# Patient Record
Sex: FEMALE | Race: OTHER | NOT HISPANIC OR LATINO | Employment: FULL TIME | URBAN - METROPOLITAN AREA
[De-identification: names, ages, dates, MRNs, and addresses within clinical notes are randomized per-mention and may not be internally consistent; named-entity substitution may affect disease eponyms.]

---

## 2024-07-26 NOTE — PROGRESS NOTES
"Subjective   Patient ID: Cleo Diaz is a 53 y.o. female who presents for Follow-up.  HPI  This 53-year-old female last seen in this office in August 2022 is being seen in follow-up.  She has been having some discomfort involving her right ear she does have a history of rheumatoid arthritis and when last seen she had developed a pigmented area on her lip.  There is been no background history of infections in the head and neck.  Swallowing and voice have been stable.  In the past she did have some difficulties with otitis media with effusion.  Review of Systems  A 12 point ROS has been reviewed and are negative for complaint except what is stated in the history of present illness and/or past medical history as noted in the EMR    Active Ambulatory Problems     Diagnosis Date Noted    LAUREN positive 12/20/2022    Mixed hearing loss of left ear 07/30/2024    Otitis media with effusion, left 07/30/2024    Pigmented skin lesion of uncertain nature 07/30/2024    Rheumatoid arthritis of multiple sites with negative rheumatoid factor (Multi) 12/20/2022    Sensorineural hearing loss of right ear 07/30/2024    Tympanosclerosis, right 07/30/2024     Resolved Ambulatory Problems     Diagnosis Date Noted    No Resolved Ambulatory Problems     Past Medical History:   Diagnosis Date    Other specified health status     Personal history of other diseases of the musculoskeletal system and connective tissue          Current Outpatient Medications:     hydroxychloroquine (Plaquenil) 200 mg tablet, Take 1 tablet (200 mg) by mouth once daily., Disp: , Rfl:      Social History     Tobacco Use    Smoking status: Never    Smokeless tobacco: Never   Substance Use Topics    Alcohol use: Yes        Height 1.6 m (5' 3\"), weight 61.2 kg (135 lb).    Objective   Physical Exam  EXAMINATION:    GENERAL DAVE.EARANCE: Alert, in no acute distress, normal pitch and clarity of voice, well-developed and nourished, cooperative.    HEAD/FACE: " Normocephalic, atraumatic, normal facial movements and strength, no no tenderness to palpation, no lesions noted.    SKIN: Normal turgor, no raised or ulcerative lesions, warm and dry to palpation.    EYES: Extraocular motions intact, no nystagmus noted, pupils equal and reactive to light and accommodation, no conjunctivitis.    EARS: Both ears--external ear anatomy is normal without lesions, auditory canals are patent and without skin abrasions or lesions, hearing is intact to voice, tympanic membranes are intact with no acute inflammation, light reflexes present, no effusions are noted and no mastoid tenderness found to palpation.    NOSE: No external skin lesions are noted, nares are patent, septum is intact, sinuses are nontender to palpation bilaterally, no intranasal lesions or inflammation is noted, nasal valve is normal.    OROPHARYNX/ORAL CAVITY: Oropharynx is not inflamed and is without lesions, mucosa of the oral cavity is intact and without lesions, tongue is midline and mobile, no acute dental disease is noted, TMJs are mobile    LUNG-- NO wheezing or rhonchi normal respiratory effort    HEART-- No venous congestion,  rate and rhythm regular,    NECK: No lymphadenopathy is palpated, carotid pulses are intact, neck is supple with full range of motion, no thyroid abnormalities are noted, trachea is midline, no neck masses are palpated.    LYMPHATICS: No cervical adenopathy or supraclavicular adenopathy is palpated.    NEUROLOGIC/PSYCH; alert and oriented, cranial nerves are grossly intact, gait is without falling, no motor deficits are noted.  Her audiogram today on the left ear revealed normal hearing up through 6000 Hz with a mild high-frequency hearing loss at 8000 Hz.  The right side had the same hearing up through 4000 Hz then a severe high-frequency hearing loss.  Discrimination scores are 100% bilaterally 10 dB RT levels 45 dB presentation levels.  Pentagrams revealed to be pattern inconsistent  with the hearing l test results.    Assessment/Plan   Problem List Items Addressed This Visit    None  Visit Diagnoses         Codes    Left ear pain    -  Primary H92.02    Sensorineural hearing loss (SNHL) of both ears     H90.3    History of rheumatoid arthritis     Z87.39            I discussed the hearing test findings with the patient.  From the standpoint of her exam she does have tympanosclerotic changes right more than left reflective of past infections and or traumas.  The hearing test revealed basically normal hearing up through 4000 Hz along with normal speech audiometry.  Tympanograms were still registering a type B pattern which likely reflects some stiffness due to the sclerosis or some residual fluid from when she may have had some congestion recently.  There is a high tone asymmetry to hearing which was discussed with her.  She has had this based on past test as well.  I made her aware the fact that if there is any increase in the asymmetry then retrocochlear testing would become necessary.  She prefers not to undergo that at this point since she is having no other symptoms.  Recheck in 1 year was recommended with an audiogram.  If she has any difficulties with pain, drainage or infection she needs to contact the office.  Jaskaran Murillo DMD, MD 07/30/24 1:41 PM

## 2024-07-30 ENCOUNTER — APPOINTMENT (OUTPATIENT)
Dept: OTOLARYNGOLOGY | Facility: CLINIC | Age: 54
End: 2024-07-30
Payer: COMMERCIAL

## 2024-07-30 ENCOUNTER — APPOINTMENT (OUTPATIENT)
Dept: AUDIOLOGY | Facility: CLINIC | Age: 54
End: 2024-07-30
Payer: COMMERCIAL

## 2024-07-30 VITALS — HEIGHT: 63 IN | WEIGHT: 135 LBS | BODY MASS INDEX: 23.92 KG/M2

## 2024-07-30 DIAGNOSIS — H90.3 SENSORINEURAL HEARING LOSS (SNHL) OF BOTH EARS: ICD-10-CM

## 2024-07-30 DIAGNOSIS — H92.02 LEFT EAR PAIN: Primary | ICD-10-CM

## 2024-07-30 DIAGNOSIS — H69.93 DYSFUNCTION OF BOTH EUSTACHIAN TUBES: ICD-10-CM

## 2024-07-30 DIAGNOSIS — H90.3 ASYMMETRICAL SENSORINEURAL HEARING LOSS: ICD-10-CM

## 2024-07-30 DIAGNOSIS — H90.3 SENSORINEURAL HEARING LOSS, ASYMMETRICAL: Primary | ICD-10-CM

## 2024-07-30 DIAGNOSIS — Z87.39 HISTORY OF RHEUMATOID ARTHRITIS: ICD-10-CM

## 2024-07-30 PROBLEM — L81.9 PIGMENTED SKIN LESION OF UNCERTAIN NATURE: Status: ACTIVE | Noted: 2024-07-30

## 2024-07-30 PROBLEM — M06.09 RHEUMATOID ARTHRITIS OF MULTIPLE SITES WITH NEGATIVE RHEUMATOID FACTOR (MULTI): Status: ACTIVE | Noted: 2022-12-20

## 2024-07-30 PROBLEM — H90.72 MIXED HEARING LOSS OF LEFT EAR: Status: ACTIVE | Noted: 2024-07-30

## 2024-07-30 PROBLEM — R76.8 ANA POSITIVE: Status: ACTIVE | Noted: 2022-12-20

## 2024-07-30 PROBLEM — H65.92 OTITIS MEDIA WITH EFFUSION, LEFT: Status: ACTIVE | Noted: 2024-07-30

## 2024-07-30 PROBLEM — H90.5 SENSORINEURAL HEARING LOSS OF RIGHT EAR: Status: ACTIVE | Noted: 2024-07-30

## 2024-07-30 PROBLEM — H74.01: Status: ACTIVE | Noted: 2024-07-30

## 2024-07-30 PROCEDURE — 92557 COMPREHENSIVE HEARING TEST: CPT | Performed by: AUDIOLOGIST

## 2024-07-30 PROCEDURE — 1036F TOBACCO NON-USER: CPT | Performed by: OTOLARYNGOLOGY

## 2024-07-30 PROCEDURE — 92550 TYMPANOMETRY & REFLEX THRESH: CPT | Performed by: AUDIOLOGIST

## 2024-07-30 PROCEDURE — 3008F BODY MASS INDEX DOCD: CPT | Performed by: OTOLARYNGOLOGY

## 2024-07-30 PROCEDURE — 99214 OFFICE O/P EST MOD 30 MIN: CPT | Performed by: OTOLARYNGOLOGY

## 2024-07-30 RX ORDER — HYDROXYCHLOROQUINE SULFATE 200 MG/1
200 TABLET, FILM COATED ORAL DAILY
COMMUNITY
Start: 2022-08-30

## 2024-07-30 NOTE — PROGRESS NOTES
[facility]  AUDIOMETRIC EVALUATION      Name:  Cleo Diaz   :  1970  Age:  53 y.o.  Date of Evaluation:  24    HISTORY    Cleo Diaz is seen today at the request of Jaskaran Murillo M.D., ALDAIR., F.A.C.S.  The patient is an established patient monitoring hearing loss progression.  She has not been seen for her hearing in many years.    EVALUATION  See scanned audiogram in Media and included at the end of this report.    RESULTS    Otoscopic Evaluation:  Right Ear:  clear   Left Ear:  clear    Tympanometry:   Right Ear:  Type B, suggestive of middle ear fluid   Left Ear:  Type B, suggestive of middle ear fluid    Acoustic reflexes were absent    Pure Tone Audiometry:    Right Ear:  normal to severe sensorineural hearing loss  Left Ear:  normal to mild sensorineural hearing loss       Speech Audiometry:    Right Ear:  excellent in quiet at a normal presentation level  Left Ear:  excellent in quiet at a normal presentation level  Speech reception thresholds were in good agreement with pure tone testing.    DISCUSSION  Results were relayed to Jaskaran Murillo M.D., ALDAIR., F.A.C.S.    APPOINTMENT TIME  11:30am-12:00pm     Carol Malave  Doctor of Audiology  Senior Audiologist

## 2025-08-04 ENCOUNTER — APPOINTMENT (OUTPATIENT)
Dept: AUDIOLOGY | Facility: CLINIC | Age: 55
End: 2025-08-04
Payer: COMMERCIAL

## 2025-08-04 ENCOUNTER — APPOINTMENT (OUTPATIENT)
Dept: OTOLARYNGOLOGY | Facility: CLINIC | Age: 55
End: 2025-08-04
Payer: COMMERCIAL